# Patient Record
Sex: FEMALE
[De-identification: names, ages, dates, MRNs, and addresses within clinical notes are randomized per-mention and may not be internally consistent; named-entity substitution may affect disease eponyms.]

---

## 2018-03-11 ENCOUNTER — HOSPITAL ENCOUNTER (EMERGENCY)
Dept: HOSPITAL 14 - H.ER | Age: 28
Discharge: HOME | End: 2018-03-11
Payer: COMMERCIAL

## 2018-03-11 VITALS
DIASTOLIC BLOOD PRESSURE: 80 MMHG | RESPIRATION RATE: 16 BRPM | HEART RATE: 110 BPM | OXYGEN SATURATION: 98 % | TEMPERATURE: 98 F | SYSTOLIC BLOOD PRESSURE: 111 MMHG

## 2018-03-11 VITALS — BODY MASS INDEX: 21.1 KG/M2

## 2018-03-11 DIAGNOSIS — K02.9: Primary | ICD-10-CM

## 2018-03-11 NOTE — ED PDOC
HPI: Dental Pain/Injury


Time Seen by Provider: 18 15:50


Chief Complaint (Nursing): Dental Pain


Chief Complaint (Provider): Dental Pain


History Per: Patient


History/Exam Limitations: no limitations


Current Symptoms Are (Timing): Still Present


Additional Complaint(s): 





27 year old female presents to ED with complaints of lower dental pain x3 days. 

Patient presents to ED with concerns of a possible infection and notes no 

relief of symptoms with Aleve. 





PCP: Harry Taylor





Past Medical History


Reviewed: Historical Data, Nursing Documentation, Vital Signs


Vital Signs: 


 Last Vital Signs











Temp  98.0 F   18 15:24


 


Pulse  110 H  18 15:24


 


Resp  16   18 15:24


 


BP  111/80   18 15:24


 


Pulse Ox  98   18 15:24














- Medical History


PMH: Asthma, Chronic Kidney Disease (Pedroza's disease)





- Surgical History


Surgical History: Tonsillectomy





- Family History


Family History: States: Unknown Family Hx





- Social History


Drugs: Denies





- Immunization History


Hx Tetanus Toxoid Vaccination: No


Hx Influenza Vaccination: No


Hx Pneumococcal Vaccination: No





- Home Medications


Home Medications: 


 Ambulatory Orders











 Medication  Instructions  Recorded


 


Albuterol 0.042% [Albuterol 0.042% 3 ml IH PRN PRN 16





Inhal Sol (1.25mg/3ml) UD]  


 


Methylprednisolone [Medrol Dose 4 mg PO DAILY #21 mg 16





Pack (21 tabs)]  


 


Promethazine HCl/Codeine 5 ml PO HS #80 ml 16





[Prometh-Codein 6.25-10 mg/5 ml]  


 


Acetaminophen [Acetaminophen Extra 2 tab PO Q6 PRN #24 tablet 01/10/17





Strength]  


 


Acetaminophen with Codeine 2 each PO QID #20 tablet 18





[Tylenol with Codeine #3 Tablet]  


 


Amoxicillin/Clavulanate [Augmentin 1 tab PO BID #20 tab 18





875 MG-125 MG]  














- Allergies


Allergies/Adverse Reactions: 


 Allergies











Allergy/AdvReac Type Severity Reaction Status Date / Time


 


ibuprofen [From Motrin] Allergy  ANAPHYLAXIS Verified 18 15:24














Review of Systems


ROS Statement: Except As Marked, All Systems Reviewed And Found Negative


ENT: Positive for: Other ((+) dental pain)





Physical Exam





- Reviewed


Nursing Documentation Reviewed: Yes


Vital Signs Reviewed: Yes





- Physical Exam


Appears: Positive for: Non-toxic, No Acute Distress


ENT: Positive for: Other (tooth #18 caried at crown without fracture. (-) 

abscess or infection. Good dentition. Enamel intact.)





- ECG


O2 Sat by Pulse Oximetry: 98 (RA)


Pulse Ox Interpretation: Normal





Medical Decision Making


Medical Decision Makin


Initial impression: toothache secondary to tammy


Initial plan:


Discharge with pain medication and antibiotics.








Scribe Attestation:


Documented by Mady Mckinney, acting as a scribe for Raudel Dyson PA-C.





Provider Scribe Attestation:


All medical record entries made by the Scribe were at my direction and 

personally dictated by me. I have reviewed the chart and agree that the record 

accurately reflects my personal performance of the history, physical exam, 

medical decision making, and the department course for this patient. I have 

also personally directed, reviewed, and agree with the discharge instructions 

and disposition.





Disposition





- Clinical Impression


Clinical Impression: 


 Tooth ache, Dental caries








- Disposition


Disposition: Routine/Home


Disposition Time: 16:15


Condition: GOOD


Additional Instructions: 


Pt will follow up with her private dentist in 2-3 days and will continue on the 

antibiotics for a full 10 days


Prescriptions: 


Acetaminophen with Codeine [Tylenol with Codeine #3 Tablet] 2 each PO QID #20 

tablet


Amoxicillin/Clavulanate [Augmentin 875 MG-125 MG] 1 tab PO BID #20 tab


Instructions:  Tooth Decay, Adult, Tooth Decay, Adult (DC), Dental Pain (DC)


Forms:  [facility] (English)

## 2018-08-24 ENCOUNTER — HOSPITAL ENCOUNTER (EMERGENCY)
Dept: HOSPITAL 14 - H.ER | Age: 28
Discharge: HOME | End: 2018-08-24
Payer: SELF-PAY

## 2018-08-24 VITALS
HEART RATE: 71 BPM | RESPIRATION RATE: 16 BRPM | OXYGEN SATURATION: 99 % | TEMPERATURE: 98.3 F | SYSTOLIC BLOOD PRESSURE: 119 MMHG | DIASTOLIC BLOOD PRESSURE: 80 MMHG

## 2018-08-24 VITALS — BODY MASS INDEX: 21.1 KG/M2

## 2018-08-24 DIAGNOSIS — S00.86XA: Primary | ICD-10-CM

## 2018-08-24 DIAGNOSIS — Z88.6: ICD-10-CM
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
## 2018-08-24 LAB
ALBUMIN SERPL-MCNC: 4.7 G/DL (ref 3.5–5)
ALBUMIN/GLOB SERPL: 1.2 {RATIO} (ref 1–2.1)
ALT SERPL-CCNC: 33 U/L (ref 9–52)
AST SERPL-CCNC: 36 U/L (ref 14–36)
BUN SERPL-MCNC: 15 MG/DL (ref 7–17)
CALCIUM SERPL-MCNC: 9.2 MG/DL (ref 8.4–10.2)
ERYTHROCYTE [DISTWIDTH] IN BLOOD BY AUTOMATED COUNT: 12.7 % (ref 11.5–14.5)
GFR NON-AFRICAN AMERICAN: > 60
HGB BLD-MCNC: 13.8 G/DL (ref 12–16)
MCH RBC QN AUTO: 31.3 PG (ref 27–31)
MCHC RBC AUTO-ENTMCNC: 33.3 G/DL (ref 33–37)
MCV RBC AUTO: 94 FL (ref 81–99)
PLATELET # BLD: 173 K/UL (ref 130–400)
RBC # BLD AUTO: 4.41 MIL/UL (ref 3.8–5.2)
WBC # BLD AUTO: 7.4 K/UL (ref 4.8–10.8)

## 2018-08-24 NOTE — ED PDOC
HPI: General Adult


Time Seen by Provider: 08/24/18 15:59


Chief Complaint (Nursing): Bite


Chief Complaint (Provider): insect bite right upper lip 


History Per: Patient


History/Exam Limitations: no limitations


Onset/Duration Of Symptoms: Mins


Have you had recent travel within the past 21 days to any of the following 

countries: [place], [place], [place] or [place]?: No


Current Symptoms Are (Timing): Better


Additional Complaint(s): 





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 

of insect right, upper lip. PT states she felt a pinch and than her lip became 

swollen. No throught pain, no SOB. PT states when she touched the area she did 

see a bugs leg. Pt states shortly after she felt some nausea and chills. 





Past Medical History


Reviewed: Historical Data, Nursing Documentation, Vital Signs


Vital Signs: 


 Last Vital Signs











Temp  98.3 F   08/24/18 15:19


 


Pulse  71   08/24/18 15:19


 


Resp  16   08/24/18 15:19


 


BP  119/80   08/24/18 15:19


 


Pulse Ox  99   08/24/18 16:18














- Medical History


PMH: Asthma, Chronic Kidney Disease (Pedroza's disease)





- Surgical History


Surgical History: Tonsillectomy





- Family History


Family History: States: Unknown Family Hx





- Immunization History


Hx Tetanus Toxoid Vaccination: No


Hx Influenza Vaccination: No


Hx Pneumococcal Vaccination: No





- Home Medications


Home Medications: 


 Ambulatory Orders











 Medication  Instructions  Recorded


 


Albuterol 0.042% [Albuterol 0.042% 3 ml IH PRN PRN 11/30/16





Inhal Sol (1.25mg/3ml) UD]  


 


Methylprednisolone [Medrol Dose 4 mg PO DAILY #21 mg 11/30/16





Pack (21 tabs)]  


 


Promethazine HCl/Codeine 5 ml PO HS #80 ml 11/30/16





[Prometh-Codein 6.25-10 mg/5 ml]  


 


Acetaminophen [Acetaminophen Extra 2 tab PO Q6 PRN #24 tablet 01/10/17





Strength]  


 


Acetaminophen with Codeine 2 each PO QID #20 tablet 03/11/18





[Tylenol with Codeine #3 Tablet]  


 


Amoxicillin/Clavulanate [Augmentin 1 tab PO BID #20 tab 03/11/18





875 MG-125 MG]  














- Allergies


Allergies/Adverse Reactions: 


 Allergies











Allergy/AdvReac Type Severity Reaction Status Date / Time


 


ibuprofen [From Motrin] Allergy  ANAPHYLAXIS Verified 03/11/18 15:24














Review of Systems


ROS Statement: Except As Marked, All Systems Reviewed And Found Negative


Constitutional: Positive for: Chills.  Negative for: Fever


Gastrointestinal: Positive for: Nausea


Genitourinary Female: Negative for: Dysuria, Frequency


Skin: Positive for: Other





Physical Exam





- Reviewed


Nursing Documentation Reviewed: Yes


Vital Signs Reviewed: Yes





- Physical Exam


Appears: Positive for: Well, Non-toxic, No Acute Distress


Head Exam: Positive for: ATRAUMATIC, NORMAL INSPECTION, NORMOCEPHALIC


Skin: Positive for: Warm.  Negative for: Normal Color ((+) insect bite, right 

upper lip without localized edema )


Eye Exam: Positive for: Normal appearance


ENT: Positive for: Normal ENT Inspection


Neck: Positive for: Normal, Painless ROM


Cardiovascular/Chest: Positive for: Regular Rate, Rhythm


Respiratory: Positive for: Normal Breath Sounds.  Negative for: Accessory 

Muscle Use, Respiratory Distress


Back: Positive for: Normal Inspection


Extremity: Positive for: Normal ROM.  Negative for: Tenderness


Neurologic/Psych: Positive for: Alert, Oriented





- Laboratory Results


Result Diagrams: 


 08/24/18 16:32





 08/24/18 16:32





- ECG


O2 Sat by Pulse Oximetry: 99





Disposition





- Clinical Impression


Clinical Impression: 


 Insect bite








- Patient ED Disposition


Is Patient to be Admitted: No


Counseled Patient/Family Regarding: Diagnosis, Need For Followup





- Disposition


Disposition: Routine/Home


Disposition Time: 17:01


Condition: GOOD


Instructions:  Insect Bites and Stings (DC)


Forms:  [facility] (English)